# Patient Record
Sex: MALE | Race: WHITE | Employment: OTHER | URBAN - METROPOLITAN AREA
[De-identification: names, ages, dates, MRNs, and addresses within clinical notes are randomized per-mention and may not be internally consistent; named-entity substitution may affect disease eponyms.]

---

## 2018-06-14 PROBLEM — E04.1 THYROID NODULE: Status: ACTIVE | Noted: 2018-06-14

## 2018-06-14 PROBLEM — I63.30 CEREBROVASCULAR ACCIDENT (CVA) DUE TO THROMBOSIS OF CEREBRAL ARTERY (HCC): Status: ACTIVE | Noted: 2018-06-14

## 2018-06-25 PROBLEM — E78.2 MIXED HYPERLIPIDEMIA: Status: ACTIVE | Noted: 2018-06-25

## 2018-07-20 PROBLEM — T73.3XXA FATIGUE DUE TO EXCESSIVE EXERTION, INITIAL ENCOUNTER: Status: ACTIVE | Noted: 2018-07-20

## 2018-07-27 PROCEDURE — 88173 CYTOPATH EVAL FNA REPORT: CPT | Performed by: OTOLARYNGOLOGY

## 2018-08-02 PROBLEM — I65.22 STENOSIS OF LEFT CAROTID ARTERY: Status: ACTIVE | Noted: 2018-08-02

## 2018-08-02 PROBLEM — E04.2 MULTIPLE THYROID NODULES: Status: ACTIVE | Noted: 2018-08-02

## 2018-10-05 PROBLEM — I65.23 BILATERAL CAROTID ARTERY STENOSIS: Status: ACTIVE | Noted: 2018-08-02

## 2024-07-15 DIAGNOSIS — E04.1 THYROID NODULE: Primary | ICD-10-CM

## 2024-08-07 ENCOUNTER — NURSE ONLY (OUTPATIENT)
Dept: LAB | Facility: HOSPITAL | Age: 74
End: 2024-08-07
Payer: MEDICARE

## 2024-08-07 ENCOUNTER — HOSPITAL ENCOUNTER (OUTPATIENT)
Dept: ULTRASOUND IMAGING | Facility: HOSPITAL | Age: 74
Discharge: HOME | End: 2024-08-07
Payer: MEDICARE

## 2024-08-07 DIAGNOSIS — I63.30: ICD-10-CM

## 2024-08-07 DIAGNOSIS — E04.1 THYROID NODULE: ICD-10-CM

## 2024-08-07 DIAGNOSIS — E66.9 OBESITY: ICD-10-CM

## 2024-08-07 DIAGNOSIS — Z86.73 HISTORY OF STROKE: ICD-10-CM

## 2024-08-07 DIAGNOSIS — I10 HYPERTENSION, ESSENTIAL: Primary | ICD-10-CM

## 2024-08-07 DIAGNOSIS — N18.31 CHRONIC KIDNEY DISEASE (CKD) STAGE G3A/A1, MODERATELY DECREASED GLOMERULAR FILTRATION RATE (GFR) BETWEEN 45-59 ML/MIN/1.73 SQUARE METER AND ALBUMINURIA CREATININE RATIO LESS THAN 30 MG/G (HCC): ICD-10-CM

## 2024-08-07 LAB
ALBUMIN SERPL-MCNC: 4.5 G/DL (ref 3.2–4.8)
ALBUMIN/GLOB SERPL: 1.5 {RATIO} (ref 1–2)
ALP LIVER SERPL-CCNC: 110 U/L
ALT SERPL-CCNC: 18 U/L
ANION GAP SERPL CALC-SCNC: 6 MMOL/L (ref 0–18)
AST SERPL-CCNC: 20 U/L (ref ?–34)
BILIRUB SERPL-MCNC: 0.6 MG/DL (ref 0.2–1.1)
BUN BLD-MCNC: 26 MG/DL (ref 9–23)
CALCIUM BLD-MCNC: 9.4 MG/DL (ref 8.7–10.4)
CHLORIDE SERPL-SCNC: 109 MMOL/L (ref 98–112)
CHOLEST SERPL-MCNC: 110 MG/DL (ref ?–200)
CO2 SERPL-SCNC: 26 MMOL/L (ref 21–32)
CREAT BLD-MCNC: 1.33 MG/DL
EGFRCR SERPLBLD CKD-EPI 2021: 56 ML/MIN/1.73M2 (ref 60–?)
EST. AVERAGE GLUCOSE BLD GHB EST-MCNC: 146 MG/DL (ref 68–126)
FASTING PATIENT LIPID ANSWER: YES
FASTING STATUS PATIENT QL REPORTED: YES
GLOBULIN PLAS-MCNC: 3 G/DL (ref 2–3.5)
GLUCOSE BLD-MCNC: 138 MG/DL (ref 70–99)
HBA1C MFR BLD: 6.7 % (ref ?–5.7)
HDLC SERPL-MCNC: 38 MG/DL (ref 40–59)
LDLC SERPL CALC-MCNC: 56 MG/DL (ref ?–100)
NONHDLC SERPL-MCNC: 72 MG/DL (ref ?–130)
OSMOLALITY SERPL CALC.SUM OF ELEC: 299 MOSM/KG (ref 275–295)
PLATELET # BLD AUTO: 184 10(3)UL (ref 150–450)
POTASSIUM SERPL-SCNC: 4.6 MMOL/L (ref 3.5–5.1)
PROT SERPL-MCNC: 7.5 G/DL (ref 5.7–8.2)
SODIUM SERPL-SCNC: 141 MMOL/L (ref 136–145)
TRIGL SERPL-MCNC: 82 MG/DL (ref 30–149)
VLDLC SERPL CALC-MCNC: 12 MG/DL (ref 0–30)

## 2024-08-07 PROCEDURE — 36415 COLL VENOUS BLD VENIPUNCTURE: CPT | Performed by: RADIOLOGY

## 2024-08-07 PROCEDURE — 85049 AUTOMATED PLATELET COUNT: CPT | Performed by: RADIOLOGY

## 2024-08-07 PROCEDURE — 88173 CYTOPATH EVAL FNA REPORT: CPT

## 2024-08-07 PROCEDURE — 80053 COMPREHEN METABOLIC PANEL: CPT

## 2024-08-07 PROCEDURE — 83036 HEMOGLOBIN GLYCOSYLATED A1C: CPT

## 2024-08-07 PROCEDURE — 80061 LIPID PANEL: CPT

## 2024-08-07 PROCEDURE — 10005 FNA BX W/US GDN 1ST LES: CPT

## 2024-08-07 NOTE — PROCEDURES
PROCEDURE: US FNA THYROID, GUIDED INCLD, FIRST LESION (CPT=10005)    COMPARISON: External Exams, US, US THYROID (CPT=76536), 7/10/2024, 4:56 PM.    INDICATIONS: E04.1 Thyroid nodule    DESCRIPTION: The risks, benefits, and alternatives of the procedure were explained to the patient and witnessed informed written and verbal consent was documented in the chart. Time out was performed by the staff. Potential complications including bleeding, infection, and the possibility of necessity for repeat biopsy due to under sampling were discussed with the patient and they related understanding. After obtaining informed consent, an ultrasound-guided FNA was performed in the usual sterile manner. The neck was prepped and draped in sterile fashion and 1% lidocaine was used for local anesthetic.    BIOPSY NEEDLE: 25 gauge FNA  SPECIMEN TYPE, #, LOCATION: Five passes performed in the 10 mm right thyroid nodule in the mid right thyroid lobe.  MEDICATION: 1% lidocaine for local anesthetic  COMPLICATIONS: None.  PATHOLOGY LAB: Pathology present for assessment of adequacy of sampling.    CONCLUSION: Uneventful ultrasound guided FNA.  The patient was instructed to obtain follow up care and FNA results from the referring physician.